# Patient Record
Sex: FEMALE | Race: WHITE
[De-identification: names, ages, dates, MRNs, and addresses within clinical notes are randomized per-mention and may not be internally consistent; named-entity substitution may affect disease eponyms.]

---

## 2019-08-11 ENCOUNTER — HOSPITAL ENCOUNTER (EMERGENCY)
Dept: HOSPITAL 65 - ER | Age: 49
LOS: 1 days | Discharge: ANOTHER HOSPITAL | End: 2019-08-12
Payer: COMMERCIAL

## 2019-08-11 VITALS — DIASTOLIC BLOOD PRESSURE: 99 MMHG | SYSTOLIC BLOOD PRESSURE: 141 MMHG

## 2019-08-11 VITALS — WEIGHT: 114 LBS | BODY MASS INDEX: 17.89 KG/M2 | HEIGHT: 67 IN

## 2019-08-11 DIAGNOSIS — I48.92: ICD-10-CM

## 2019-08-11 DIAGNOSIS — Z79.899: ICD-10-CM

## 2019-08-11 DIAGNOSIS — K74.60: ICD-10-CM

## 2019-08-11 DIAGNOSIS — K85.90: Primary | ICD-10-CM

## 2019-08-11 LAB
APPEARANCE UR: CLEAR
BASOPHILS # BLD AUTO: 0 10^3/UL (ref 0–0.1)
BASOPHILS NFR BLD AUTO: 0.4 % (ref 0–0.2)
BILIRUB UR STRIP.AUTO-MCNC: NEGATIVE MG/DL
COLOR UR: YELLOW
EOSINOPHIL # BLD AUTO: 0.2 10^3/UL (ref 0–0.2)
EOSINOPHIL NFR BLD AUTO: 2.4 % (ref 0–5)
ERYTHROCYTE [DISTWIDTH] IN BLOOD BY AUTOMATED COUNT: 16.4 % (ref 11.5–14.5)
HGB BLD-MCNC: 11 G/DL (ref 12–15)
LYMPHOCYTES # BLD AUTO: 1.7 10^3/UL (ref 1–4.8)
LYMPHOCYTES NFR BLD AUTO: 23.9 % (ref 24–44)
MCH RBC QN AUTO: 34.7 PG (ref 26–34)
MCHC RBC AUTO-ENTMCNC: 34.4 G/DL (ref 33–37)
MCV RBC AUTO: 100.9 FL (ref 78–100)
MONOCYTES # BLD AUTO: 0.9 10^3/UL (ref 0.3–0.8)
MONOCYTES NFR BLD AUTO: 13.4 % (ref 5–12)
NEUTROPHILS # BLD AUTO: 4.1 10^3/UL (ref 1.8–7.7)
NEUTROPHILS NFR BLD AUTO: 59.3 % (ref 41–85)
PLATELET # BLD AUTO: 84 10^3/UL (ref 150–400)
PMV BLD AUTO: 11.9 FL (ref 7.8–11)
UROBILINOGEN UR QL STRIP.AUTO: 1
WBC # BLD AUTO: 7 10^3/UL (ref 4.5–11)

## 2019-08-11 PROCEDURE — 82140 ASSAY OF AMMONIA: CPT

## 2019-08-11 PROCEDURE — 85025 COMPLETE CBC W/AUTO DIFF WBC: CPT

## 2019-08-11 PROCEDURE — 85610 PROTHROMBIN TIME: CPT

## 2019-08-11 PROCEDURE — 86677 HELICOBACTER PYLORI ANTIBODY: CPT

## 2019-08-11 PROCEDURE — 96375 TX/PRO/DX INJ NEW DRUG ADDON: CPT

## 2019-08-11 PROCEDURE — 99285 EMERGENCY DEPT VISIT HI MDM: CPT

## 2019-08-11 PROCEDURE — 96372 THER/PROPH/DIAG INJ SC/IM: CPT

## 2019-08-11 PROCEDURE — 36415 COLL VENOUS BLD VENIPUNCTURE: CPT

## 2019-08-11 PROCEDURE — 74176 CT ABD & PELVIS W/O CONTRAST: CPT

## 2019-08-11 PROCEDURE — 80320 DRUG SCREEN QUANTALCOHOLS: CPT

## 2019-08-11 PROCEDURE — 96374 THER/PROPH/DIAG INJ IV PUSH: CPT

## 2019-08-11 PROCEDURE — 81002 URINALYSIS NONAUTO W/O SCOPE: CPT

## 2019-08-11 PROCEDURE — 83690 ASSAY OF LIPASE: CPT

## 2019-08-11 PROCEDURE — 82150 ASSAY OF AMYLASE: CPT

## 2019-08-11 PROCEDURE — 85730 THROMBOPLASTIN TIME PARTIAL: CPT

## 2019-08-11 PROCEDURE — 93005 ELECTROCARDIOGRAM TRACING: CPT

## 2019-08-11 PROCEDURE — 80053 COMPREHEN METABOLIC PANEL: CPT

## 2019-08-11 NOTE — ER.PDOC
General


Chief Complaint:  Requesting Medical Care


Stated Complaint:  ABD PAIN


Time seen by MD:  23:33


Source:  patient


Exam Limitations:  no limitations





History of Present Illness


Timing/Duration:  24 hours


Severity/Quality:  moderate


Radiation:  no radiation


Associated Symptoms:  swelling/mass in abdomen


Exacerbated by:  nothing


Relieved By:  nothing


Allergies:  


Coded Allergies:  


     No Known Drug Allergies (Verified  Allergy, Unknown, 8/11/19)





Past Medical History


Medical History:  no pertinent history





Reviewed


Nursing Reviewed:  Vital Signs, Abn. Noted





All Other Systems:  Reviewed and Negative





Physical Exam


General Appearance:  No Apparent Distress, WD/WN


HEENT:  PERRL/EOMI, Normal ENT Inspection, TMs Normal, Pharynx Normal


Neck:  Non-Tender, Full Range of Motion, Supple, Normal Inspection


Respiratory:  chest non-tender, lungs clear, normal breath sounds, no 

respiratory distress, no accessory muscle use


Cardiovascular:  Normal Peripheral Pulses, Regular Rate, Rhythm, No Edema, No 

Gallop, No JVD, No Murmur


Gastrointestinal:  Distended, Tenderness


Extremities:  Swelling


Neurologic/Psychiatric:  CNs II-XII NML as Tested, No Motor/Sensory Deficits, 

Alert, Normal Mood/Affect, Oriented x 3


Skin:  Normal Color, Warm/Dry


Lymphatic:  No Adenopathy





Results/Orders


Results/Orders





Orders - PATI SAEZ MD


Cbc With Auto Diff (8/11/19 23:20)


Comprehensive Metabolic Panel (8/11/19 23:20)


Amylase (8/11/19 23:20)


Lipase (8/11/19 23:20)


Helicobacter Pylori (8/11/19 23:20)


PT (8/11/19 23:20)


Partial Thromboplastin Time. (8/11/19 23:20)


Urinalysis (8/11/19 23:20)


Ct Abd/Pelvis Wo Iv Contrast (8/11/19 23:20)


Fentanyl Citrate/Pf (Sublimaze) (8/11/19 23:20)


Ondansetron (Zofran Odt) (8/11/19 23:20)


Ammonia (8/11/19 23:31)


Ondansetron (Zofran Odt) (8/11/19 23:56)


Fentanyl Citrate/Pf (Sublimaze) (8/11/19 23:57)


Alcohol(Ml) (8/12/19 00:16)


0.9 % Sodium Chloride (Ns 1000ml) (8/12/19 01:00)


0.9 % Sodium Chloride (Ns 1000ml) (8/12/19 00:46)





Administered Medications








 Medications


  (Trade)  Dose


 Ordered  Sig/Ralph


 Route


 PRN Reason  Start Time


 Stop Time Status Last Admin


Dose Admin


 


 Fentanyl Citrate


  (Sublimaze)  50 mcg  STAT  STAT


 IM


   8/11/19 23:20


 8/11/19 23:23 DC 8/12/19 00:05


50 MCG


 


 Ondansetron HCl


  (Zofran Odt)  4 mg  STAT  STAT


 SL


   8/11/19 23:20


 8/11/19 23:23 DC 8/12/19 00:05


4 MG


 


 Sodium Chloride  1,000 ml @ 


 0 mls/hr  Q0M ONCE


 IV


   8/12/19 01:00


 8/12/19 01:01 DC 8/12/19 00:52


0 MLS/HR








                                Laboratory Tests








Test


 8/11/19


23:00 8/11/19


23:43


 


Urine Collection Type UNKNOWN   


 


Urine Color


 YELLOW


(YELLOW) 





 


Urine Appearance CLEAR (CLEAR)   


 


Urine Bilirubin


 NEGATIVE MG/DL


(NEGATIVE) 





 


Urine Ketones


 NEGATIVE


(NEGATIVE) 





 


Urine Specific Gravity


 1.020


(1.005-1.035) 





 


Urine pH 6 (5.0-6.0)   


 


Urine Protein


 NEGATIVE


(NEGATIVE) 





 


Urine Urobilinogen


 1.0 (NEGATIVE)


H 





 


Urine Nitrate


 NEGATIVE


(NEGATIVE) 





 


Urine Leukocyte Esterase


 NEGATIVE


(NEGATIVE) 





 


Urine Blood


 NEGATIVE


(NEGATIVE) 





 


Urine Glucose


 NORMAL


(NEGATIVE) 





 


White Blood Count


 


 7.0 10^3/uL


(4.5-11.0)


 


Red Blood Count


 


 3.17 10^6/uL


(4.00-5.20)  L


 


Hemoglobin


 


 11.0 g/dL


(12.0-15.0)  L


 


Hematocrit


 


 32.0 %


(36.0-46.0)  L


 


Mean Corpuscular Volume


 


 100.9 fL


()  H


 


Mean Corpuscular Hemoglobin


 


 34.7 pg


(26-34)  H


 


Mean Corpuscular Hemoglobin


Concent 


 34.4 g/dL


(33-37)


 


Red Cell Distribution Width


 


 16.4 %


(11.5-14.5)  H


 


Platelet Count


 


 84 10^3/uL


(150-400)  L


 


Mean Platelet Volume


 


 11.9 fL


(7.8-11.0)  H


 


Neutrophils (%) (Auto)


 


 59.3 %


(41.0-85.0)


 


Lymphocytes (%) (Auto)


 


 23.9 %


(24.0-44.0)  L


 


Monocytes (%) (Auto)


 


 13.4 %


(5.0-12.0)  H


 


Neutrophils # (Auto)


 


 4.1 10^3/uL


(1.8-7.7)


 


Lymphocytes # (Auto)


 


 1.7 10^3/uL


(1.0-4.8)


 


Monocytes # (Auto)


 


 0.9 10^3/uL


(0.3-0.8)  H


 


Absolute Immature Granulocyte


(auto 


 0.04 10^3 u/L


(0-2)


 


Immature Granulocytes %


 


 0.60 %


(0.00-0.50)  H


 


Eosinophils %


 


 2.4 %


(0.0-5.0)


 


Basophils %


 


 0.4 %


(0.0-0.2)  H


 


Basophils #


 


 0.0 10^3/uL


(0.0-0.1)


 


Eosinophil Count


 


 0.2 10^3/uL


(0.0-0.2)


 


Prothrombin Time


 


 13.4 SEC


(9.4-11.5)  H


 


Prothrombin Time INR


(Non-Therap) 


 1.3  





 


Activated Partial


Thromboplast Time 


 32.9 SEC


(24.67-30.72)


 


Sodium Level


 


 135 mmol/L


(132-145)


 


Potassium Level


 


 4.3 mmol/L


(3.6-5.2)


 


Chloride Level


 


 105.0 mmol/L


()


 


Carbon Dioxide Level


 


 19.8 mmol/L


(20.0-32)  L


 


Anion Gap  14.5  


 


Blood Urea Nitrogen


 


 18 mg/dL


(7-18)


 


Creatinine


 


 1.49 mg/dL


(0.59-1.40)  H


 


Estimated GFR (


American) 


 44.6 (>/=60)  





 


BUN/Creatinine Ratio  12.0  


 


Glucose Level


 


 90 mg/dL


()


 


Calcium Level


 


 8.1 mg/dL


(8.4-10.5)  L


 


Total Bilirubin


 


 2.6 mg/dL


(0.2-1.0)  H


 


Aspartate Amino Transferase


(AST) 


 269 U/L (0-35)


H


 


Alanine Aminotransferase (ALT)


 


 166 U/L


(12-78)  H


 


Alkaline Phosphatase


 


 250 U/L


()  H


 


Ammonia


 


 80 umol/L


(11-35)  H


 


Total Protein


 


 6.6 g/dL


(6.4-8.2)


 


Albumin


 


 2.6 g/dL


(3.4-5.0)  L


 


Globulin  4.0  


 


Amylase Level


 


 243 U/L


()  H


 


Lipase


 


 1356 U/L


(114-286)  H


 


Helicobacter pylori Screen


 


 NEGATIVE


(NEGATIVE)











Consult/PCP


Time Consult/PCP Called:  01:25





Course


Vitals & review Data





Laboratory Tests








Test


 8/11/19


23:00 8/11/19


23:43


 


Urine Collection Type UNKNOWN  


 


Urine Color YELLOW  


 


Urine Appearance CLEAR  


 


Urine Bilirubin NEGATIVE MG/DL  


 


Urine Ketones NEGATIVE  


 


Urine Specific Gravity 1.020  


 


Urine pH 6  


 


Urine Protein NEGATIVE  


 


Urine Urobilinogen 1.0  


 


Urine Nitrate NEGATIVE  


 


Urine Leukocyte Esterase NEGATIVE  


 


Urine Blood NEGATIVE  


 


Urine Glucose NORMAL  


 


White Blood Count  7.0 10^3/uL 


 


Red Blood Count  3.17 10^6/uL 


 


Hemoglobin  11.0 g/dL 


 


Hematocrit  32.0 % 


 


Mean Corpuscular Volume  100.9 fL 


 


Mean Corpuscular Hemoglobin  34.7 pg 


 


Mean Corpuscular Hemoglobin


Concent 


 34.4 g/dL 





 


Red Cell Distribution Width  16.4 % 


 


Platelet Count  84 10^3/uL 


 


Mean Platelet Volume  11.9 fL 


 


Neutrophils (%) (Auto)  59.3 % 


 


Lymphocytes (%) (Auto)  23.9 % 


 


Monocytes (%) (Auto)  13.4 % 


 


Neutrophils # (Auto)  4.1 10^3/uL 


 


Lymphocytes # (Auto)  1.7 10^3/uL 


 


Monocytes # (Auto)  0.9 10^3/uL 


 


Absolute Immature Granulocyte


(auto 


 0.04 10^3 u/L 





 


Immature Granulocytes %  0.60 % 


 


Eosinophils %  2.4 % 


 


Basophils %  0.4 % 


 


Basophils #  0.0 10^3/uL 


 


Eosinophil Count  0.2 10^3/uL 


 


Prothrombin Time  13.4 SEC 


 


Prothrombin Time INR


(Non-Therap) 


 1.3 





 


Activated Partial


Thromboplast Time 


 32.9 SEC 





 


Sodium Level  135 mmol/L 


 


Potassium Level  4.3 mmol/L 


 


Chloride Level  105.0 mmol/L 


 


Carbon Dioxide Level  19.8 mmol/L 


 


Anion Gap  14.5 


 


Blood Urea Nitrogen  18 mg/dL 


 


Creatinine  1.49 mg/dL 


 


Estimated GFR (


American) 


 44.6 





 


BUN/Creatinine Ratio  12.0 


 


Glucose Level  90 mg/dL 


 


Calcium Level  8.1 mg/dL 


 


Total Bilirubin  2.6 mg/dL 


 


Aspartate Amino Transf


(AST/SGOT) 


 269 U/L 





 


Alanine Aminotransferase


(ALT/SGPT) 


 166 U/L 





 


Alkaline Phosphatase  250 U/L 


 


Ammonia  80 umol/L 


 


Total Protein  6.6 g/dL 


 


Albumin  2.6 g/dL 


 


Globulin  4.0 


 


Amylase Level  243 U/L 


 


Lipase  1356 U/L 


 


Helicobacter pylori Screen  NEGATIVE 











Departure


Time of Disposition:  01:33


Disposition:  02 XFER SHT-TRM HOSP


Impression:  


   Primary Impression:  


   Pancreatitis, gallstone


   Additional Impression:  


   Cirrhosis of liver


Condition:  Improved


Referrals:  


PCP,UNKNOWN (PCP)


PRIMARY CARE PROVIDER


Duration or Time Spent with Pa:  30 m





Problem Qualifiers











PATI SAEZ MD              Aug 11, 2019 23:35

## 2019-08-11 NOTE — NUR
ARRIVAL 

PATIENT PRESENTS WITH COMPLAINTS OF GENERALIZED ABDOMINAL PAIN FOR THE PAST 
DAY. HX HEPATITIS C, CIRRHOSIS, GALLSTONES. RATES PAIN 9/10 AT THIS TIME. 
AMBULATORY WITH STEADY GAIT. CONNECTED TO MONITOR. VSS. NO IMMEDIATE DISTRESS 
NOTED. MD SE NOTIFIED.

## 2019-08-12 VITALS — DIASTOLIC BLOOD PRESSURE: 75 MMHG | SYSTOLIC BLOOD PRESSURE: 134 MMHG

## 2019-08-12 VITALS — SYSTOLIC BLOOD PRESSURE: 130 MMHG | DIASTOLIC BLOOD PRESSURE: 98 MMHG

## 2019-08-12 VITALS — DIASTOLIC BLOOD PRESSURE: 78 MMHG | SYSTOLIC BLOOD PRESSURE: 132 MMHG

## 2019-08-12 VITALS — SYSTOLIC BLOOD PRESSURE: 136 MMHG | DIASTOLIC BLOOD PRESSURE: 81 MMHG

## 2019-08-12 VITALS — SYSTOLIC BLOOD PRESSURE: 139 MMHG | DIASTOLIC BLOOD PRESSURE: 79 MMHG

## 2019-08-12 VITALS — SYSTOLIC BLOOD PRESSURE: 137 MMHG | DIASTOLIC BLOOD PRESSURE: 79 MMHG

## 2019-08-12 LAB
ALP INTEST CFR SERPL: 250 U/L (ref 50–136)
ALT SERPL-CCNC: 166 U/L (ref 12–78)
AST SERPL-CCNC: 269 U/L (ref 0–35)
CALCIUM SERPL-MCNC: 8.1 MG/DL (ref 8.4–10.5)
CO2 BLDA-SCNC: 19.8 MMOL/L (ref 20–32)
GLUCOSE PRE 100 G GLC PO SERPL-MCNC: 90 MG/DL (ref 70–110)

## 2019-08-12 NOTE — NUR
[facility]

HANDOFF REPORT GIVEN TO [facility]. ALL QUESTIONS ANSWERED. PATIENT STABLE AT 
TIME OF HANDOFF.

## 2019-08-12 NOTE — NUR
HR

MEDICATIONS ADMINISTERED PER ORDERS OF DR SAEZ; SEE MAR. PATIENT'S HR 
CURRENTLY 97. MD SE AT BEDSIDE.

## 2019-08-12 NOTE — DIREP
PROCEDURE:CT ABDOMEN/PELVIS W/O CONTRAST

 

COMPARISON:None.

 

INDICATIONS:ABDOMINAL PAIN, H/O CIRRHOSIS

 

TECHNIQUE:Axial images were created through the abdomen and pelvis without 

intravenous contrast material. 

No oral contrast was administered.

Sagittal and coronal reconstructions were performed from source images.

 

FINDINGS:

LUNG BASES:Small emphysematous bleb in the medial aspect of the left lower 

lung field.  No acute infiltrates. 

LIVER:Cirrhotic liver with a small amount of ascites surrounding the liver and 

throughout the abdomen  

BILIARY:Cholelithiasis.  

PANCREAS:Normal.  No lesion, fluid collection, ductal dilatation, or atrophy.  

 

SPLEEN:Normal.  No enlargement or focal lesion. 

ADRENALS:Normal.  No mass or enlargement.  

URINARY TRACT:Normal.  No focal lesions or hydronephrosis.  No 

nephrolithiasis, ureterolithiasis or ureteral obstruction.

AORTA/VASCULAR:Normal.  No aneurysm. 

RETROPERITONEUM:Normal.  No mass or adenopathy.  

BOWEL/MESENTERY:Limited by lack of oral or IV contrast.  Small hiatal hernia.  

No obvious ileus or obstruction or perforation.  If clinical symptoms persist 

or worsen, follow-up study with oral and IV contrast might be of aid in further 

evaluation.

ABDOMINAL WALL:Normal.  No mass or hernia.  

PELVIC ORGANS:Normal.  No visible mass.  Pelvic organs appropriate for patient 

age.  

BONES:Normal for age.  No bony lesion or acute fracture.  

OTHER:Negative.  

 

CONCLUSION:

1. Cirrhotic liver.

2. Prominent vessels between the spleen and stomach most likely representing 

gastric varices.

3. Small amount of ascites surrounding the liver and scattered throughout 

remainder of the abdomen.

4. Cholelithiasis.

5. Small hiatal hernia.

6. No CT evidence of ileus, obstruction or perforation.  The appendix is not 

visualized.  If clinical symptoms persist or worsen, follow-up study with oral 

and IV contrast might be helpful in further evaluation for a subtle 

abnormality.

 

 

 

Dictated by: Sandip Garcia M.D. on 08/12/2019 at 01:04 AM     

Electronically Signed By: Sandip Garcia M.D. on 08/12/2019 at 01:11 AM

## 2019-08-12 NOTE — PCM.EKG
[facility]

                                       

Test Date:    2019               Test Time:    03:04:28

Pat Name:     NAVDEEP YANEZ          Department:   

[id number]          Room:          

Gender:       F                        Technician:   MA

:          [id number]               Requested By: PATI SYKES

Order Number: [id number]           Reading MD:   Pati Sykes

                                 Measurements

Intervals                              Axis          

Rate:         159                      P:            

VT:                                    QRS:          34

QRSD:         150                      T:            73

QT:           300                                    

QTc:          488                                    

                           Interpretive Statements

sv tachycardia

Nonspecific intraventricular block

Abnormal ECG

No previous ECG available for comparison



Electronically Signed On 8- 15:45:40 CDT by Pati Sykes



Please click the below link to view image of tracing.

## 2019-08-19 ENCOUNTER — HOSPITAL ENCOUNTER (EMERGENCY)
Dept: HOSPITAL 65 - ER | Age: 49
Discharge: OTHER INSTITUTION | End: 2019-08-19
Payer: COMMERCIAL

## 2019-08-19 VITALS — SYSTOLIC BLOOD PRESSURE: 117 MMHG | DIASTOLIC BLOOD PRESSURE: 68 MMHG

## 2019-08-19 VITALS — SYSTOLIC BLOOD PRESSURE: 117 MMHG | DIASTOLIC BLOOD PRESSURE: 67 MMHG

## 2019-08-19 VITALS — SYSTOLIC BLOOD PRESSURE: 121 MMHG | DIASTOLIC BLOOD PRESSURE: 99 MMHG

## 2019-08-19 VITALS — BODY MASS INDEX: 19.3 KG/M2 | HEIGHT: 67 IN | WEIGHT: 123 LBS

## 2019-08-19 VITALS — DIASTOLIC BLOOD PRESSURE: 99 MMHG | SYSTOLIC BLOOD PRESSURE: 121 MMHG

## 2019-08-19 DIAGNOSIS — K85.90: Primary | ICD-10-CM

## 2019-08-19 DIAGNOSIS — D64.9: ICD-10-CM

## 2019-08-19 DIAGNOSIS — F17.210: ICD-10-CM

## 2019-08-19 DIAGNOSIS — Z90.710: ICD-10-CM

## 2019-08-19 DIAGNOSIS — K74.60: ICD-10-CM

## 2019-08-19 DIAGNOSIS — I50.9: ICD-10-CM

## 2019-08-19 DIAGNOSIS — R73.9: ICD-10-CM

## 2019-08-19 DIAGNOSIS — J44.9: ICD-10-CM

## 2019-08-19 LAB
ALP INTEST CFR SERPL: 153 U/L (ref 50–136)
ALT SERPL-CCNC: 135 U/L (ref 12–78)
APPEARANCE UR: CLEAR
AST SERPL-CCNC: 244 U/L (ref 0–35)
BASOPHILS # BLD AUTO: 0 10^3/UL (ref 0–0.1)
BASOPHILS NFR BLD AUTO: 0.3 % (ref 0–0.2)
BILIRUB UR STRIP.AUTO-MCNC: NEGATIVE MG/DL
CALCIUM SERPL-MCNC: 8 MG/DL (ref 8.4–10.5)
CO2 BLDA-SCNC: 26.6 MMOL/L (ref 20–32)
COLOR UR: YELLOW
EOSINOPHIL # BLD AUTO: 0.1 10^3/UL (ref 0–0.2)
EOSINOPHIL NFR BLD AUTO: 2.4 % (ref 0–5)
ERYTHROCYTE [DISTWIDTH] IN BLOOD BY AUTOMATED COUNT: 16 % (ref 11.5–14.5)
GLUCOSE PRE 100 G GLC PO SERPL-MCNC: 176 MG/DL (ref 70–110)
HGB BLD-MCNC: 9.3 G/DL (ref 12–15)
LYMPHOCYTES # BLD AUTO: 1.1 10^3/UL (ref 1–4.8)
LYMPHOCYTES NFR BLD AUTO: 28.6 % (ref 24–44)
MCH RBC QN AUTO: 35 PG (ref 26–34)
MCHC RBC AUTO-ENTMCNC: 34.4 G/DL (ref 33–37)
MCV RBC AUTO: 101.5 FL (ref 78–100)
MONOCYTES # BLD AUTO: 0.4 10^3/UL (ref 0.3–0.8)
MONOCYTES NFR BLD AUTO: 10.2 % (ref 5–12)
NEUTROPHILS # BLD AUTO: 2.2 10^3/UL (ref 1.8–7.7)
NEUTROPHILS NFR BLD AUTO: 58.2 % (ref 41–85)
PLATELET # BLD AUTO: 69 10^3/UL (ref 150–400)
PMV BLD AUTO: 10.7 FL (ref 7.8–11)
UROBILINOGEN UR QL STRIP.AUTO: NORMAL
WBC # BLD AUTO: 3.7 10^3/UL (ref 4.5–11)

## 2019-08-19 PROCEDURE — 96375 TX/PRO/DX INJ NEW DRUG ADDON: CPT

## 2019-08-19 PROCEDURE — 81002 URINALYSIS NONAUTO W/O SCOPE: CPT

## 2019-08-19 PROCEDURE — 85610 PROTHROMBIN TIME: CPT

## 2019-08-19 PROCEDURE — 82150 ASSAY OF AMYLASE: CPT

## 2019-08-19 PROCEDURE — 99285 EMERGENCY DEPT VISIT HI MDM: CPT

## 2019-08-19 PROCEDURE — 83690 ASSAY OF LIPASE: CPT

## 2019-08-19 PROCEDURE — 36415 COLL VENOUS BLD VENIPUNCTURE: CPT

## 2019-08-19 PROCEDURE — 85730 THROMBOPLASTIN TIME PARTIAL: CPT

## 2019-08-19 PROCEDURE — 96374 THER/PROPH/DIAG INJ IV PUSH: CPT

## 2019-08-19 PROCEDURE — 74176 CT ABD & PELVIS W/O CONTRAST: CPT

## 2019-08-19 PROCEDURE — 96361 HYDRATE IV INFUSION ADD-ON: CPT

## 2019-08-19 PROCEDURE — 86677 HELICOBACTER PYLORI ANTIBODY: CPT

## 2019-08-19 PROCEDURE — 80053 COMPREHEN METABOLIC PANEL: CPT

## 2019-08-19 PROCEDURE — 85025 COMPLETE CBC W/AUTO DIFF WBC: CPT

## 2019-08-19 NOTE — DIREP
PROCEDURE:CT ABDOMEN/PELVIS W/O CONTRAST

 

COMPARISON:[facility], CT, CT ABD/PELVIS W/O, 08/11/2019, 11:37 

PM.

 

INDICATIONS:upper abdominal pain

 

TECHNIQUE:Axial images were created through the abdomen and pelvis without 

intravenous contrast material. 

No oral contrast was administered.

Sagittal and coronal reconstructions were performed from source images.

 

FINDINGS:

LUNG BASES:Normal.  No visible pulmonary or pleural disease.  

LIVER:Small nodular liver

BILIARY:Gallstone

PANCREAS:Normal.  No lesion, fluid collection, ductal dilatation, or atrophy.  

 

SPLEEN:Normal.  No enlargement or focal lesion. 

ADRENALS:Normal.  No mass or enlargement.  

URINARY TRACT:Normal.  No focal lesions or hydronephrosis.

AORTA/VASCULAR: Aortoiliac atherosclerotic vascular calcium.  

RETROPERITONEUM:Normal.  No mass or adenopathy.  

BOWEL/MESENTERY:There appears to be diffuse gastric and small bowel wall 

thickening

ABDOMINAL WALL:Normal.  No mass or hernia.  

PELVIC ORGANS:Normal.  No visible mass.  Pelvic organs appropriate for patient 

age.  

BONES:Right femoral surgical hardware

OTHER:Mild ascites

 

CONCLUSION:

1. Cirrhotic liver.  Mild ascites.

2. Cholelithiasis without imaging evidence for cholecystitis.  

3. Diffuse gastric and small bowel wall thickening is suspected.  This could be 

artifactual secondary to lack of distention with oral contrast.  However, most 

likely etiology is metabolic abnormality for example hypoalbuminemia.  

 

 

Dictated by: Santos Gtz Jr. on 08/19/2019 at 09:38 PM     

Electronically Signed By: Santos Gtz Jr. on 08/19/2019 at 09:41 PM

## 2019-08-19 NOTE — ER.PDOC
General


Chief Complaint:  Female Urogenital Problems


Stated Complaint:  FEMALE 


Time seen by MD:  20:15


Source:  patient


Exam Limitations:  no limitations





History of Present Illness


Initial Comments


abdominal pain for one day, patient was admitted to [facility] one week 

ago for pancreatitis, she states her abd pain got better and that she was 

discharged from the hospital and the pain came back yesterday, nausea without 

vomiting and diarrhea, no fever, abd pain is upper abdoment and dull in nature 

simialr to previous pancreatitis attack.


Timing/Duration:  24 hours


Severity/Quality:  moderate


Radiation:  RUQ, LUQ, epigastric


Exacerbated by:  food


Relieved By:  nothing


Allergies:  


Coded Allergies:  


     No Known Drug Allergies (Verified  Allergy, Unknown, 19)


Vital Signs





First Vital Signs








  Date Time  Temp Pulse Resp B/P (MAP) Pulse Ox O2 Delivery O2 Flow Rate FiO2


 


19 03:02  162      


 


19 03:24 208.9       


 


19 20:24   18     


 


19 20:24     99 Room Air  


 


19 20:24    117/67 (84)    








Last Vital Signs








  Date Time  Temp Pulse Resp B/P (MAP) Pulse Ox O2 Delivery O2 Flow Rate FiO2


 


19 21:30 99.3 92 16 117/68 (84) 99 Room Air  











Past Medical History


Medical History:  congestive heart failure, COPD


Surgical History:  , hysterectomy, other


LMP (females 10-50):  hysterectomy





Social History


Smoking:  cigarettes


Alcohol Use:  none


Drug Use:  none





Constitutional:  denies fever


EENTM:  denies ear pain, denies throat pain


Respiratory:  denies cough, denies shortness of breath, denies SOB with 

exertion, denies SOB at rest, denies wheezing


Cardiovascular:  denies chest pain, denies irregular heart rate, denies 

lightheadedness, denies palpitations, denies syncope


Gastrointestinal:  abdominal pain, diarrhea, nausea


Genitourinary:  denies dysuria, denies flank pain


Musculoskeletal:  denies back pain


Skin:  denies rash


Psychiatric/Neurological:  denies anxiety


Endocrine:  denies excessive sweating


Hematologic/Lymphatic:  denies anemia





Physical Exam


General Appearance:  No Apparent Distress


HEENT:  PERRL/EOMI, Normal ENT Inspection


Neck:  Non-Tender


Respiratory:  chest non-tender, lungs clear, normal breath sounds


Cardiovascular:  Regular Rate, Rhythm


Gastrointestinal:  Soft, Tenderness


Back:  Normal Inspection, No CVA Tenderness


Extremities:  Normal Range of Motion, Non-Tender


Neurologic/Psychiatric:  CNs II-XII NML as Tested, No Motor/Sensory Deficits, 

Alert, Normal Mood/Affect


Skin:  Normal Color





Results/Orders


Results/Orders





Orders - STEVEN FRENCH MD


Cbc With Auto Diff (19 20:31)


Comprehensive Metabolic Panel (19 20:31)


Amylase (19 20:31)


Lipase (19 20:31)


Helicobacter Pylori (19 20:31)


PT (19 20:31)


Partial Thromboplastin Time. (19 20:31)


Urinalysis (19 20:31)


Saline Lock (19 20:31)


Ondansetron Hcl (Zofran) (19 20:33)


Morphine Sulfate (Morphine Sulfate) (19 21:00)


0.9 % Sodium Chloride (Ns 1000ml) (19 21:00)


0.9 % Sodium Chloride (Ns 1000ml) (19 20:33)


0.9 % Sodium Chloride (Ns 1000ml) (19 20:45)


Ondansetron Hcl (Zofran) (19 20:46)


Morphine Sulfate (Morphine Sulfate) (19 20:46)


Ct Abd/Pelvis Wo Iv Contrast (19 21:18)





Vital Signs








  Date Time  Temp Pulse Resp B/P (MAP) Pulse Ox O2 Delivery O2 Flow Rate FiO2


 


19 21:30 99.3 92 16 117/68 (84) 99 Room Air  


 


19 20:24 99.3 95 18 117/67 (84) 99 Room Air  


 


19 20:24 99.3 95 18  99 Room Air  


 


19 20:24 99.3 95 18     


 


19 03:24 208.9 97      


 


19 03:02  162      








Administered Medications








 Medications


  (Trade)  Dose


 Ordered  Sig/Ralph


 Route


 PRN Reason  Start Time


 Stop Time Status Last Admin


Dose Admin


 


 Morphine Sulfate


  (Morphine


 Sulfate)  4 mg  STAT  ONCE


 IV


   19 21:00


 19 21:01 DC 19 20:56


4 MG


 


 Ondansetron HCl


  (Zofran)  4 mg  STAT  STAT


 IV


   19 20:33


 19 20:35 DC 19 20:56


4 MG


 


 Sodium Chloride  1,000 ml @ 


 0 mls/hr  Q0M ONCE


 IV


   19 21:00


 19 21:01 DC 19 20:56


1,200 MLS/HR








                                Laboratory Tests








Test


 19


20:42


 


White Blood Count


 3.7 10^3/uL


(4.5-11.0)  L


 


Red Blood Count


 2.66 10^6/uL


(4.00-5.20)  L


 


Hemoglobin


 9.3 g/dL


(12.0-15.0)  L


 


Hematocrit


 27.0 %


(36.0-46.0)  L


 


Mean Corpuscular Volume


 101.5 fL


()  H


 


Mean Corpuscular Hemoglobin


 35.0 pg


(26-34)  H


 


Mean Corpuscular Hemoglobin


Concent 34.4 g/dL


(33-37)


 


Red Cell Distribution Width


 16.0 %


(11.5-14.5)  H


 


Platelet Count


 69 10^3/uL


(150-400)  L


 


Mean Platelet Volume


 10.7 fL


(7.8-11.0)


 


Neutrophils (%) (Auto)


 58.2 %


(41.0-85.0)


 


Lymphocytes (%) (Auto)


 28.6 %


(24.0-44.0)


 


Monocytes (%) (Auto)


 10.2 %


(5.0-12.0)


 


Neutrophils # (Auto)


 2.2 10^3/uL


(1.8-7.7)


 


Lymphocytes # (Auto)


 1.1 10^3/uL


(1.0-4.8)


 


Monocytes # (Auto)


 0.4 10^3/uL


(0.3-0.8)


 


Absolute Immature Granulocyte


(auto 0.01 10^3 u/L


(0-2)


 


Immature Granulocytes %


 0.30 %


(0.00-0.50)


 


Eosinophils %


 2.4 %


(0.0-5.0)


 


Basophils %


 0.3 %


(0.0-0.2)  H


 


Basophils #


 0.0 10^3/uL


(0.0-0.1)


 


Eosinophil Count


 0.1 10^3/uL


(0.0-0.2)


 


Prothrombin Time


 14.5 SEC


(9.4-11.5)  H


 


Prothrombin Time INR


(Non-Therap) 1.4  





 


Activated Partial


Thromboplast Time 35.0 SEC


(24.67-30.72)


 


Urine Collection Type VOID  


 


Urine Color


 YELLOW


(YELLOW)


 


Urine Appearance CLEAR (CLEAR)  


 


Urine Bilirubin


 NEGATIVE MG/DL


(NEGATIVE)


 


Urine Ketones


 NEGATIVE


(NEGATIVE)


 


Urine Specific Gravity


 1.010


(1.005-1.035)


 


Urine pH 8 (5.0-6.0)  


 


Urine Protein


 NEGATIVE


(NEGATIVE)


 


Urine Urobilinogen


 NORMAL


(NEGATIVE)


 


Urine Nitrate


 NEGATIVE


(NEGATIVE)


 


Urine Leukocyte Esterase


 NEGATIVE


(NEGATIVE)


 


Urine Blood


 NEGATIVE


(NEGATIVE)


 


Urine Glucose


 NORMAL


(NEGATIVE)


 


Sodium Level


 138 mmol/L


(132-145)


 


Potassium Level


 4.2 mmol/L


(3.6-5.2)


 


Chloride Level


 103.0 mmol/L


()


 


Carbon Dioxide Level


 26.6 mmol/L


(20.0-32)


 


Anion Gap 12.6  


 


Blood Urea Nitrogen


 8 mg/dL (7-18)





 


Creatinine


 1.52 mg/dL


(0.59-1.40)  H


 


Estimated GFR (


American) 43.6 (>/=60)  





 


BUN/Creatinine Ratio 5.0  


 


Glucose Level


 176 mg/dL


()  H


 


Calcium Level


 8.0 mg/dL


(8.4-10.5)  L


 


Total Bilirubin


 1.9 mg/dL


(0.2-1.0)  H


 


Aspartate Amino Transferase


(AST) 244 U/L (0-35)


H


 


Alanine Aminotransferase (ALT)


 135 U/L


(12-78)  H


 


Alkaline Phosphatase


 153 U/L


()  H


 


Total Protein


 5.6 g/dL


(6.4-8.2)  L


 


Albumin


 2.0 g/dL


(3.4-5.0)  L


 


Globulin 3.6  


 


Amylase Level


 180 U/L


()  H


 


Lipase


 1146 U/L


(114-286)  H


 


Helicobacter pylori Screen


 NEGATIVE


(NEGATIVE)











Progress


Progress


discussed case with [facility] transfer line, auto accepted by dr rebolledo , patient 

transferred to [facility] secondary to just being released from their facility on 







Course


Sepsis Screening Results: Posi:  POSITIVE SEPSIS RISK


Duration or Total Time Spent w:  30 m


Vitals & review Data





Vital Sign - Last 24 Hours








 8/19





 03:02 03:24 20:24 20:24


 


Temp  208.9 99.3 99.3


 


Pulse 162 97 95 95


 


Resp   18 18


 


Pulse Ox    99


 


O2 Delivery    Room Air


 


    





 19  





 20:24 21:30  


 


Temp 99.3 99.3  


 


Pulse 95 92  


 


Resp 18 16  


 


B/P (MAP) 117/67 (84) 117/68 (84)  


 


Pulse Ox 99 99  


 


O2 Delivery Room Air Room Air  








Laboratory Tests








Test


 19


20:42


 


White Blood Count 3.7 10^3/uL 


 


Red Blood Count 2.66 10^6/uL 


 


Hemoglobin 9.3 g/dL 


 


Hematocrit 27.0 % 


 


Mean Corpuscular Volume 101.5 fL 


 


Mean Corpuscular Hemoglobin 35.0 pg 


 


Mean Corpuscular Hemoglobin


Concent 34.4 g/dL 





 


Red Cell Distribution Width 16.0 % 


 


Platelet Count 69 10^3/uL 


 


Mean Platelet Volume 10.7 fL 


 


Neutrophils (%) (Auto) 58.2 % 


 


Lymphocytes (%) (Auto) 28.6 % 


 


Monocytes (%) (Auto) 10.2 % 


 


Neutrophils # (Auto) 2.2 10^3/uL 


 


Lymphocytes # (Auto) 1.1 10^3/uL 


 


Monocytes # (Auto) 0.4 10^3/uL 


 


Absolute Immature Granulocyte


(auto 0.01 10^3 u/L 





 


Immature Granulocytes % 0.30 % 


 


Eosinophils % 2.4 % 


 


Basophils % 0.3 % 


 


Basophils # 0.0 10^3/uL 


 


Eosinophil Count 0.1 10^3/uL 


 


Prothrombin Time 14.5 SEC 


 


Prothrombin Time INR


(Non-Therap) 1.4 





 


Activated Partial


Thromboplast Time 35.0 SEC 





 


Urine Collection Type VOID 


 


Urine Color YELLOW 


 


Urine Appearance CLEAR 


 


Urine Bilirubin NEGATIVE MG/DL 


 


Urine Ketones NEGATIVE 


 


Urine Specific Gravity 1.010 


 


Urine pH 8 


 


Urine Protein NEGATIVE 


 


Urine Urobilinogen NORMAL 


 


Urine Nitrate NEGATIVE 


 


Urine Leukocyte Esterase NEGATIVE 


 


Urine Blood NEGATIVE 


 


Urine Glucose NORMAL 


 


Sodium Level 138 mmol/L 


 


Potassium Level 4.2 mmol/L 


 


Chloride Level 103.0 mmol/L 


 


Carbon Dioxide Level 26.6 mmol/L 


 


Anion Gap 12.6 


 


Blood Urea Nitrogen 8 mg/dL 


 


Creatinine 1.52 mg/dL 


 


Estimated GFR (


American) 43.6 





 


BUN/Creatinine Ratio 5.0 


 


Glucose Level 176 mg/dL 


 


Calcium Level 8.0 mg/dL 


 


Total Bilirubin 1.9 mg/dL 


 


Aspartate Amino Transf


(AST/SGOT) 244 U/L 





 


Alanine Aminotransferase


(ALT/SGPT) 135 U/L 





 


Alkaline Phosphatase 153 U/L 


 


Total Protein 5.6 g/dL 


 


Albumin 2.0 g/dL 


 


Globulin 3.6 


 


Amylase Level 180 U/L 


 


Lipase 1146 U/L 


 


Helicobacter pylori Screen NEGATIVE 








Sepsis Infection Criteria Pres:  None


O2 Sat by Pulse Oximetry:  99





Departure


Time of Disposition:  21:52


Disposition:  70 DISC/XFER TO [facility]


Impression:  


   Primary Impression:  


   Pancreatitis


   Additional Impressions:  


   Anemia


   Cirrhosis


   Hyperglycemia


Condition:  Critical


Referrals:  


PCP,UNKNOWN (PCP)


PRIMARY CARE PROVIDER


Duration or Time Spent with Pa:  15





Problem Qualifiers











STEVEN FRENCH MD           Aug 19, 2019 20:39

## 2019-08-19 NOTE — NUR
REPORT TO [facility]

REPORT GIVEN TO ANN VALENTE RN AT [facility] ED

## 2019-08-24 ENCOUNTER — HOSPITAL ENCOUNTER (EMERGENCY)
Dept: HOSPITAL 65 - ER | Age: 49
LOS: 1 days | Discharge: ANOTHER HOSPITAL | End: 2019-08-25
Payer: COMMERCIAL

## 2019-08-24 VITALS — SYSTOLIC BLOOD PRESSURE: 105 MMHG | DIASTOLIC BLOOD PRESSURE: 54 MMHG

## 2019-08-24 VITALS — HEIGHT: 67 IN | BODY MASS INDEX: 20.25 KG/M2 | WEIGHT: 129 LBS

## 2019-08-24 DIAGNOSIS — I50.9: ICD-10-CM

## 2019-08-24 DIAGNOSIS — K85.10: Primary | ICD-10-CM

## 2019-08-24 DIAGNOSIS — K74.60: ICD-10-CM

## 2019-08-24 DIAGNOSIS — F17.200: ICD-10-CM

## 2019-08-24 DIAGNOSIS — E86.0: ICD-10-CM

## 2019-08-24 DIAGNOSIS — Z79.1: ICD-10-CM

## 2019-08-24 DIAGNOSIS — Z79.899: ICD-10-CM

## 2019-08-24 DIAGNOSIS — J44.9: ICD-10-CM

## 2019-08-24 DIAGNOSIS — N17.9: ICD-10-CM

## 2019-08-24 DIAGNOSIS — Z90.710: ICD-10-CM

## 2019-08-24 LAB
ALP INTEST CFR SERPL: 175 U/L (ref 50–136)
ALT SERPL-CCNC: 92 U/L (ref 12–78)
AST SERPL-CCNC: 150 U/L (ref 0–35)
BASOPHILS # BLD AUTO: 0 10^3/UL (ref 0–0.1)
BASOPHILS NFR BLD AUTO: 0.3 % (ref 0–0.2)
CALCIUM SERPL-MCNC: 7.6 MG/DL (ref 8.4–10.5)
CO2 BLDA-SCNC: 22.7 MMOL/L (ref 20–32)
EOSINOPHIL # BLD AUTO: 0.1 10^3/UL (ref 0–0.2)
EOSINOPHIL NFR BLD AUTO: 2.6 % (ref 0–5)
ERYTHROCYTE [DISTWIDTH] IN BLOOD BY AUTOMATED COUNT: 16.4 % (ref 11.5–14.5)
GLUCOSE PRE 100 G GLC PO SERPL-MCNC: 105 MG/DL (ref 70–110)
HGB BLD-MCNC: 9.1 G/DL (ref 12–15)
LYMPHOCYTES # BLD AUTO: 1.3 10^3/UL (ref 1–4.8)
LYMPHOCYTES NFR BLD AUTO: 34.5 % (ref 24–44)
MCH RBC QN AUTO: 35.4 PG (ref 26–34)
MCHC RBC AUTO-ENTMCNC: 34.5 G/DL (ref 33–37)
MCV RBC AUTO: 102.7 FL (ref 78–100)
MONOCYTES # BLD AUTO: 0.5 10^3/UL (ref 0.3–0.8)
MONOCYTES NFR BLD AUTO: 11.6 % (ref 5–12)
NEUTROPHILS # BLD AUTO: 2 10^3/UL (ref 1.8–7.7)
NEUTROPHILS NFR BLD AUTO: 50.7 % (ref 41–85)
PLATELET # BLD AUTO: 64 10^3/UL (ref 150–400)
PMV BLD AUTO: 12 FL (ref 7.8–11)
WBC # BLD AUTO: 3.9 10^3/UL (ref 4.5–11)

## 2019-08-24 PROCEDURE — 36415 COLL VENOUS BLD VENIPUNCTURE: CPT

## 2019-08-24 PROCEDURE — 83690 ASSAY OF LIPASE: CPT

## 2019-08-24 PROCEDURE — 96361 HYDRATE IV INFUSION ADD-ON: CPT

## 2019-08-24 PROCEDURE — 96372 THER/PROPH/DIAG INJ SC/IM: CPT

## 2019-08-24 PROCEDURE — 96374 THER/PROPH/DIAG INJ IV PUSH: CPT

## 2019-08-24 PROCEDURE — 85025 COMPLETE CBC W/AUTO DIFF WBC: CPT

## 2019-08-24 PROCEDURE — 99285 EMERGENCY DEPT VISIT HI MDM: CPT

## 2019-08-24 PROCEDURE — 80053 COMPREHEN METABOLIC PANEL: CPT

## 2019-08-24 NOTE — ER.PDOC
General


Chief Complaint:  Abdomen Pain


Stated Complaint:  PANCREATITIS


Time seen by MD:  23:19


Source:  patient


Exam Limitations:  no limitations





History of Present Illness


Initial Comments


Abdominal pain today. Patient was seen here on the  and  of this Month 

and transferred to [facility] both times. She has gallstones causing recurrent 

pancreatitis. The second time she was transferred to [facility] ED, she was released 

form the ED. She has liver cirrhosis.


Severity/Quality:  moderate


Radiation:  back


Associated Symptoms:  denies symptoms


Exacerbated by:  nothing


Relieved By:  nothing


Allergies:  


Coded Allergies:  


     No Known Drug Allergies (Verified  Allergy, Unknown, 19)


Home Meds


Reported Medications


Torsemide (DEMADEX) 20 Mg Tablet, 10 MG PO DAILY24, TABLET


   19


Hydroxyzine Hcl (HYDROXYZINE HCL) 25 Mg Tablet, 1 TAB PO HS, #30 TAB


   19


Naproxen (NAPROXEN) 500 Mg Tablet, 1 TAB PO BID for PAIN, #60 TAB 1 Refill


   19


Potassium Chloride (POTASSIUM CHLORIDE) 10 Meq Capsule.er, 1 CAP PO DAILY, #90 

CAP 1 Refill


   19


Lactulose (LACTULOSE) 10 Gm/15 Ml Solution, 30 MILLILITER PO BID, #5400 

MILLILITER 3 Refills


   19


Omeprazole (OMEPRAZOLE) 20 Mg Capsule.dr, 1 CAP PO BID, #30 CAP 5 Refills


   19


Furosemide (FUROSEMIDE) 20 Mg Tablet, 1 TAB PO DAILY, #90 TAB 1 Refill


   19


Spironolactone 25MG (ALDACTONE 25MG) 25 Mg Tablet, 1 TAB PO BID, #90 TAB 1 

Refill


   19


Promethazine Hcl (PROMETHAZINE HCL) 25 Mg Tablet, 25 MG PO PRN PRN for 

NAUSEA/VOMITING, TABLET


   19


Pantoprazole Sodium (PANTOPRAZOLE SODIUM) 40 Mg Tablet.dr, 1 TAB PO DAILY, #30 

TAB 3 Refills


   19


Vital Signs





First Vital Signs








  Date Time  Temp Pulse Resp B/P (MAP) Pulse Ox O2 Delivery O2 Flow Rate FiO2


 


19 03:02  162      


 


19 23:05 97.9  18  100 Room Air  


 


19 23:05    105/54 (71)    








Last Vital Signs








  Date Time  Temp Pulse Resp B/P (MAP) Pulse Ox O2 Delivery O2 Flow Rate FiO2


 


19 23:05 97.9 79 18     


 


19 23:05    105/54 (71) 100 Room Air  











Past Medical History


Medical History:  congestive heart failure, COPD, other


Surgical History:  , hysterectomy, other


LMP (females 10-50):  hysterectomy





Social History


Smoking:  less than 1 pack/day


Alcohol Use:  none


Drug Use:  none





Constitutional:  no symptoms reported


EENTM:  no symptoms reported


Respiratory:  no symptoms reported


Cardiovascular:  no symptoms reported


Gastrointestinal:  see HPI


All Other Systems:  Reviewed and Negative





Physical Exam


General Appearance:  No Apparent Distress, WD/WN


Neck:  Non-Tender, Full Range of Motion, Supple, Normal Inspection


Respiratory:  chest non-tender, lungs clear, normal breath sounds, no 

respiratory distress, no accessory muscle use


Cardiovascular:  Normal Peripheral Pulses, Regular Rate, Rhythm, No Edema, No 

Gallop, No JVD, No Murmur


Gastrointestinal:  Normal Bowel Sounds, No Organomegaly, No Pulsatile Mass, 

Tenderness (epigastric)


Back:  Normal Inspection, No CVA Tenderness, No Vertebral Tenderness


Extremities:  Normal Range of Motion, Non-Tender, Normal Inspection, No Pedal 

Edema, No Calf Tenderness, Normal Capillary Refill, Pelvis Stable


Neurologic/Psychiatric:  CNs II-XII NML as Tested, No Motor/Sensory Deficits, 

Alert, Normal Mood/Affect, Oriented x 3


Skin:  Normal Color, Warm/Dry





Results/Orders


Results/Orders





Orders - KELLY CHÁVEZ MD


Cbc With Auto Diff (19 23:12)


Comprehensive Metabolic Panel (19 23:12)


Lipase (19 23:12)


Morphine Sulfate (Morphine Sulfate) (19 23:18)


Morphine Sulfate (Morphine Sulfate) (19 23:34)


0.9 % Sodium Chloride (Ns 1000ml) (19 23:51)


Ondansetron Hcl (Zofran) (19 23:51)


0.9 % Sodium Chloride (Ns 1000ml) (19 00:07)


Ondansetron Hcl (Zofran) (19 00:07)


0.9 % Sodium Chloride (Ns 1000ml) (19 01:04)





Vital Signs








  Date Time  Temp Pulse Resp B/P (MAP) Pulse Ox O2 Delivery O2 Flow Rate FiO2


 


19 23:05 97.9 79 18     


 


19 23:05 97.9 79 18 105/54 (71) 100 Room Air  


 


19 23:05 97.9 79 18  100 Room Air  


 


19 22:43  94      


 


19 03:02  162      








Administered Medications








 Medications


  (Trade)  Dose


 Ordered  Sig/Ralph


 Route


 PRN Reason  Start Time


 Stop Time Status Last Admin


Dose Admin


 


 Morphine Sulfate


  (Morphine


 Sulfate)  4 mg  STAT  STAT


 IM


   19 23:18


 19 23:20 DC 19 23:40


4 MG


 


 Ondansetron HCl


  (Zofran)  4 mg  STAT  STAT


 IV


   19 23:51


 19 23:53 DC 19 00:17


4 MG


 


 Sodium Chloride  1,000 ml @ 


 1,200 mls/hr  Q50M STAT


 IV


   19 23:51


 19 00:40 DC 19 00:17


1,200 MLS/HR








                                Laboratory Tests








Test


 19


23:20


 


White Blood Count


 3.9 10^3/uL


(4.5-11.0)  L


 


Red Blood Count


 2.57 10^6/uL


(4.00-5.20)  L


 


Hemoglobin


 9.1 g/dL


(12.0-15.0)  L


 


Hematocrit


 26.4 %


(36.0-46.0)  L


 


Mean Corpuscular Volume


 102.7 fL


()  H


 


Mean Corpuscular Hemoglobin


 35.4 pg


(26-34)  H


 


Mean Corpuscular Hemoglobin


Concent 34.5 g/dL


(33-37)


 


Red Cell Distribution Width


 16.4 %


(11.5-14.5)  H


 


Platelet Count


 64 10^3/uL


(150-400)  L


 


Mean Platelet Volume


 12.0 fL


(7.8-11.0)  H


 


Neutrophils (%) (Auto)


 50.7 %


(41.0-85.0)


 


Lymphocytes (%) (Auto)


 34.5 %


(24.0-44.0)


 


Monocytes (%) (Auto)


 11.6 %


(5.0-12.0)


 


Neutrophils # (Auto)


 2.0 10^3/uL


(1.8-7.7)


 


Lymphocytes # (Auto)


 1.3 10^3/uL


(1.0-4.8)


 


Monocytes # (Auto)


 0.5 10^3/uL


(0.3-0.8)


 


Absolute Immature Granulocyte


(auto 0.01 10^3 u/L


(0-2)


 


Immature Granulocytes %


 0.30 %


(0.00-0.50)


 


Eosinophils %


 2.6 %


(0.0-5.0)


 


Basophils %


 0.3 %


(0.0-0.2)  H


 


Basophils #


 0.0 10^3/uL


(0.0-0.1)


 


Eosinophil Count


 0.1 10^3/uL


(0.0-0.2)


 


Sodium Level


 139 mmol/L


(132-145)


 


Potassium Level


 4.3 mmol/L


(3.6-5.2)


 


Chloride Level


 108.0 mmol/L


()


 


Carbon Dioxide Level


 22.7 mmol/L


(20.0-32)


 


Anion Gap 12.6  


 


Blood Urea Nitrogen


 20 mg/dL


(7-18)  H


 


Creatinine


 2.73 mg/dL


(0.59-1.40)  *H


 


Estimated GFR (


American) 22.2 (>/=60)  





 


BUN/Creatinine Ratio 7.0  


 


Glucose Level


 105 mg/dL


()


 


Calcium Level


 7.6 mg/dL


(8.4-10.5)  L


 


Total Bilirubin


 2.1 mg/dL


(0.2-1.0)  H


 


Aspartate Amino Transferase


(AST) 150 U/L (0-35)


H


 


Alanine Aminotransferase (ALT)


 92 U/L (12-78)


H


 


Alkaline Phosphatase


 175 U/L


()  H


 


Total Protein


 5.5 g/dL


(6.4-8.2)  L


 


Albumin


 2.0 g/dL


(3.4-5.0)  L


 


Globulin 3.5  


 


Lipase


 945 U/L


(114-286)  H











Progress


Progress


CT abdomen/pelvis done on 19: Cirrhotic liver.  Mild ascites.


2. Cholelithiasis without imaging evidence for cholecystitis.  


3. Diffuse gastric and small bowel wall thickening is suspected.  This could be 


artifactual secondary to lack of distention with oral contrast.  However, most 


likely etiology is metabolic abnormality for example hypoalbuminemia.





Course


Sepsis Screening Results: Posi:  POSITIVE SEPSIS RISK


Duration or Total Time Spent w:  15


Vitals & review Data





Vital Sign - Last 24 Hours








 19





 03:02 22:43 23:05 23:05


 


Temp   97.9 97.9


 


Pulse 162 94 79 79


 


Resp   18 18


 


B/P (MAP)    105/54 (71)


 


Pulse Ox   100 100


 


O2 Delivery   Room Air Room Air


 


    





 19   





 23:05   


 


Temp 97.9   


 


Pulse 79   


 


Resp 18   








Laboratory Tests








Test


 19


23:20


 


White Blood Count 3.9 10^3/uL 


 


Red Blood Count 2.57 10^6/uL 


 


Hemoglobin 9.1 g/dL 


 


Hematocrit 26.4 % 


 


Mean Corpuscular Volume 102.7 fL 


 


Mean Corpuscular Hemoglobin 35.4 pg 


 


Mean Corpuscular Hemoglobin


Concent 34.5 g/dL 





 


Red Cell Distribution Width 16.4 % 


 


Platelet Count 64 10^3/uL 


 


Mean Platelet Volume 12.0 fL 


 


Neutrophils (%) (Auto) 50.7 % 


 


Lymphocytes (%) (Auto) 34.5 % 


 


Monocytes (%) (Auto) 11.6 % 


 


Neutrophils # (Auto) 2.0 10^3/uL 


 


Lymphocytes # (Auto) 1.3 10^3/uL 


 


Monocytes # (Auto) 0.5 10^3/uL 


 


Absolute Immature Granulocyte


(auto 0.01 10^3 u/L 





 


Immature Granulocytes % 0.30 % 


 


Eosinophils % 2.6 % 


 


Basophils % 0.3 % 


 


Basophils # 0.0 10^3/uL 


 


Eosinophil Count 0.1 10^3/uL 


 


Sodium Level 139 mmol/L 


 


Potassium Level 4.3 mmol/L 


 


Chloride Level 108.0 mmol/L 


 


Carbon Dioxide Level 22.7 mmol/L 


 


Anion Gap 12.6 


 


Blood Urea Nitrogen 20 mg/dL 


 


Creatinine 2.73 mg/dL 


 


Estimated GFR (


American) 22.2 





 


BUN/Creatinine Ratio 7.0 


 


Glucose Level 105 mg/dL 


 


Calcium Level 7.6 mg/dL 


 


Total Bilirubin 2.1 mg/dL 


 


Aspartate Amino Transf


(AST/SGOT) 150 U/L 





 


Alanine Aminotransferase


(ALT/SGPT) 92 U/L 





 


Alkaline Phosphatase 175 U/L 


 


Total Protein 5.5 g/dL 


 


Albumin 2.0 g/dL 


 


Globulin 3.5 


 


Lipase 945 U/L 








                               Current Medications








 Medications


  (Trade)  Dose


 Ordered  Sig/Ralph


 PRN Reason  Start Time


 Stop Time Status Last Admin


 


 Sodium Chloride  1,000 ml @ 


 1,200 mls/hr  Q50M STAT


   19 01:04


 19 01:53   











Sepsis Infection Criteria Pres:  None


O2 Sat by Pulse Oximetry:  100





Departure


Time of Disposition:  01:09


Disposition:  02 XFER [facility] HOSP


Impression:  


   Primary Impression:  


   Acute kidney failure, unspecified


   Additional Impressions:  


   Gallstone pancreatitis


   Dehydration


   Liver cirrhosis


Condition:  Stable


Referrals:  


PCP,UNKNOWN (PCP)


PRIMARY CARE PROVIDER


Comments


Transfer to [facility] ED for Dr. Garcia.


Duration or Time Spent with Pa:  45 mins





Problem Qualifiers








   Primary Impression:  


   Acute kidney failure, unspecified


   Acute renal failure type:  unspecified  Qualified Codes:  N17.9 - Acute 

   kidney failure, unspecified


   Additional Impressions:  


   Liver cirrhosis


   Hepatic cirrhosis type:  unspecified hepatic cirrhosis  Ascites presence:  

   unspecified  Qualified Codes:  K74.60 - Unspecified cirrhosis of liver








KELLY CHÁVEZ MD                Aug 24, 2019 23:20

## 2019-08-25 VITALS — DIASTOLIC BLOOD PRESSURE: 53 MMHG | SYSTOLIC BLOOD PRESSURE: 91 MMHG

## 2019-08-25 VITALS — DIASTOLIC BLOOD PRESSURE: 57 MMHG | SYSTOLIC BLOOD PRESSURE: 86 MMHG

## 2019-08-25 VITALS — DIASTOLIC BLOOD PRESSURE: 61 MMHG | SYSTOLIC BLOOD PRESSURE: 91 MMHG

## 2019-08-25 VITALS — SYSTOLIC BLOOD PRESSURE: 95 MMHG | DIASTOLIC BLOOD PRESSURE: 56 MMHG

## 2019-08-25 VITALS — DIASTOLIC BLOOD PRESSURE: 58 MMHG | SYSTOLIC BLOOD PRESSURE: 89 MMHG

## 2019-08-25 VITALS — DIASTOLIC BLOOD PRESSURE: 53 MMHG | SYSTOLIC BLOOD PRESSURE: 85 MMHG

## 2019-08-25 VITALS — SYSTOLIC BLOOD PRESSURE: 89 MMHG | DIASTOLIC BLOOD PRESSURE: 58 MMHG

## 2019-08-25 NOTE — NUR
[facility]



[facility] states that they will call us back when they get to the hospital 
to let us know if they will be able to take transfer.

## 2019-08-25 NOTE — NUR
UPDATE

PATIENTS BLOOD PRESSURE 80/44 NOTIFIED DR. CHÁVEZ AND NO NEW ORDERS.  IV NS BOLUS 
INFUSING AT THIS TIME.

## 2019-08-25 NOTE — NUR
DEPART

[facility] ARRIVED TO TRANSPORT PATIENT TO [facility] ED.  REPORT GIVEN RELINQUISHED 
CARE.  PATIENT AWAKE AND ALERT AND TRANSFERRED TO [facility].  IV OF NS INFUSING AT 
100ML/HR WITHOUT COMPLICATION.

## 2019-09-10 ENCOUNTER — HOSPITAL ENCOUNTER (EMERGENCY)
Dept: HOSPITAL 65 - ER | Age: 49
LOS: 1 days | Discharge: ANOTHER HOSPITAL | End: 2019-09-11
Payer: COMMERCIAL

## 2019-09-10 VITALS — SYSTOLIC BLOOD PRESSURE: 100 MMHG | DIASTOLIC BLOOD PRESSURE: 53 MMHG

## 2019-09-10 VITALS — BODY MASS INDEX: 16.79 KG/M2 | HEIGHT: 67 IN | WEIGHT: 107 LBS

## 2019-09-10 VITALS — DIASTOLIC BLOOD PRESSURE: 60 MMHG | SYSTOLIC BLOOD PRESSURE: 110 MMHG

## 2019-09-10 VITALS — DIASTOLIC BLOOD PRESSURE: 68 MMHG | SYSTOLIC BLOOD PRESSURE: 122 MMHG

## 2019-09-10 DIAGNOSIS — R11.2: ICD-10-CM

## 2019-09-10 DIAGNOSIS — N17.9: Primary | ICD-10-CM

## 2019-09-10 DIAGNOSIS — Z79.899: ICD-10-CM

## 2019-09-10 DIAGNOSIS — K76.7: ICD-10-CM

## 2019-09-10 DIAGNOSIS — Z79.1: ICD-10-CM

## 2019-09-10 DIAGNOSIS — K72.90: ICD-10-CM

## 2019-09-10 DIAGNOSIS — Z90.710: ICD-10-CM

## 2019-09-10 LAB
ALP INTEST CFR SERPL: 145 U/L (ref 50–136)
ALT SERPL-CCNC: 64 U/L (ref 12–78)
AST SERPL-CCNC: 124 U/L (ref 0–35)
CALCIUM SERPL-MCNC: 8.3 MG/DL (ref 8.4–10.5)
CO2 BLDA-SCNC: 20.4 MMOL/L (ref 20–32)
ERYTHROCYTE [DISTWIDTH] IN BLOOD BY AUTOMATED COUNT: 14.9 % (ref 11.5–14.5)
GLUCOSE PRE 100 G GLC PO SERPL-MCNC: 106 MG/DL (ref 70–110)
HGB BLD-MCNC: 8.3 G/DL (ref 12–15)
MCH RBC QN AUTO: 34.3 PG (ref 26–34)
MCHC RBC AUTO-ENTMCNC: 34.4 G/DL (ref 33–37)
MCV RBC AUTO: 99.6 FL (ref 78–100)
PLATELET # BLD AUTO: 68 10^3/UL (ref 150–400)
PMV BLD AUTO: 11.5 FL (ref 7.8–11)
WBC # BLD AUTO: 5 10^3/UL (ref 4.5–11)

## 2019-09-10 PROCEDURE — 82140 ASSAY OF AMMONIA: CPT

## 2019-09-10 PROCEDURE — 96361 HYDRATE IV INFUSION ADD-ON: CPT

## 2019-09-10 PROCEDURE — 83690 ASSAY OF LIPASE: CPT

## 2019-09-10 PROCEDURE — 96374 THER/PROPH/DIAG INJ IV PUSH: CPT

## 2019-09-10 PROCEDURE — 80048 BASIC METABOLIC PNL TOTAL CA: CPT

## 2019-09-10 PROCEDURE — 36415 COLL VENOUS BLD VENIPUNCTURE: CPT

## 2019-09-10 PROCEDURE — 80076 HEPATIC FUNCTION PANEL: CPT

## 2019-09-10 PROCEDURE — 99285 EMERGENCY DEPT VISIT HI MDM: CPT

## 2019-09-10 PROCEDURE — 85027 COMPLETE CBC AUTOMATED: CPT

## 2019-09-10 NOTE — ER.PDOC
General


Chief Complaint:  Requesting Medical Care


Stated Complaint:  VOMITING


Time seen by MD:  22:58


Source:  patient


Exam Limitations:  no limitations





History of Present Illness


Initial Comments


Nausea/vomiting today. Patient has gallbladder disease and liver cirrhosis and 

no one will do Surgery for her. Patient acting confused.


Severity/Quality:  moderate


Associated Symptoms (vomiting):  freq vomitng


Prior symptoms/Treatment:  Similar symptoms previous, Recenly Seen, Treated by 

Doctor


Allergies:  


Coded Allergies:  


     No Known Drug Allergies (Verified  Allergy, Unknown, 19)


Home Meds


Reported Medications


Torsemide (DEMADEX) 20 Mg Tablet, 10 MG PO DAILY24, TABLET


   19


Hydroxyzine Hcl (HYDROXYZINE HCL) 25 Mg Tablet, 1 TAB PO HS, #30 TAB


   19


Naproxen (NAPROXEN) 500 Mg Tablet, 1 TAB PO BID for PAIN, #60 TAB 1 Refill


   19


Potassium Chloride (POTASSIUM CHLORIDE) 10 Meq Capsule.er, 1 CAP PO DAILY, #90 

CAP 1 Refill


   19


Lactulose (LACTULOSE) 10 Gm/15 Ml Solution, 30 MILLILITER PO BID, #5400 

MILLILITER 3 Refills


   19


Omeprazole (OMEPRAZOLE) 20 Mg Capsule.dr, 1 CAP PO BID, #30 CAP 5 Refills


   19


Furosemide (FUROSEMIDE) 20 Mg Tablet, 1 TAB PO DAILY, #90 TAB 1 Refill


   19


Spironolactone 25MG (ALDACTONE 25MG) 25 Mg Tablet, 1 TAB PO BID, #90 TAB 1 

Refill


   19


Promethazine Hcl (PROMETHAZINE HCL) 25 Mg Tablet, 25 MG PO PRN PRN for 

NAUSEA/VOMITING, TABLET


   19


Pantoprazole Sodium (PANTOPRAZOLE SODIUM) 40 Mg Tablet.dr, 1 TAB PO DAILY, #30 

TAB 3 Refills


   19


Vital Signs





First Vital Signs








  Date Time  Temp Pulse Resp B/P (MAP) Pulse Ox O2 Delivery O2 Flow Rate FiO2


 


19 03:02  162      








Last Vital Signs








  Date Time  Temp Pulse Resp B/P (MAP) Pulse Ox O2 Delivery O2 Flow Rate FiO2


 


19 02:12  81      











Past Medical History


Medical History:  other (Liver cirrhosis)


Surgical History:  , hysterectomy





Social History


Drug Use:  none





Constitutional:  no symptoms reported


Respiratory:  no symptoms reported


Cardiovascular:  no symptoms reported


Gastrointestinal:  see HPI


Genitourinary:  no symptoms reported


All Other Systems:  Reviewed and Negative





Physical Exam


General Appearance:  No Apparent Distress, WD/WN, Cachetic


HEENT:  Scleral Icterus (R), Scleral Icterus (L), Pale Conjunctivae (R), Pale 

Conjunctivae (L)


Neck:  Non-Tender, Full Range of Motion, Supple, Normal Inspection


Respiratory:  chest non-tender, lungs clear, normal breath sounds, no 

respiratory distress, no accessory muscle use


Cardiovascular:  Normal Peripheral Pulses, Regular Rate, Rhythm, No Edema, No 

Gallop, No JVD, No Murmur


Gastrointestinal:  Normal Bowel Sounds, No Pulsatile Mass, Non Tender, Distended


Back:  Normal Inspection, No CVA Tenderness, No Vertebral Tenderness


Extremities:  Normal Range of Motion, Non-Tender, Normal Inspection, No Pedal 

Edema, No Calf Tenderness, Normal Capillary Refill, Pelvis Stable


Neurologic/Psychiatric:  Other (altered)





Results/Orders


Results/Orders





Orders - KELLY CHÁVEZ MD


0.9 % Sodium Chloride (Ns 1000ml) (9/10/19 22:32)


Promethazine Hcl (Phenergan) (9/10/19 22:32)


Cbc W/O Diff (9/10/19 22:32)


Basic Metabolic Panel (9/10/19 22:32)


0.9 % Sodium Chloride (Ns 1000ml) (9/10/19 22:34)


Promethazine Hcl (Phenergan) (9/10/19 22:34)


Liver Function Panel (9/10/19 23:32)


Lipase (9/10/19 23:32)


Ammonia (9/10/19 23:38)


Lactulose (Cephulac) (19 00:03)





Vital Signs








  Date Time  Temp Pulse Resp B/P (MAP) Pulse Ox O2 Delivery O2 Flow Rate FiO2


 


19 02:12  81      


 


19 03:02  162      








Administered Medications








 Medications


  (Trade)  Dose


 Ordered  Sig/Ralph


 Route


 PRN Reason  Start Time


 Stop Time Status Last Admin


Dose Admin


 


 Promethazine HCl


  (Phenergan)  25 mg  STAT  STAT


 IV


   9/10/19 22:32


 9/10/19 22:34 DC 9/10/19 22:43


25 MG


 


 Sodium Chloride  1,000 ml @ 


 1,200 mls/hr  Q50M STAT


 IV


   9/10/19 22:32


 9/10/19 23:21 DC 9/10/19 22:43


1,200 MLS/HR








                                Laboratory Tests








Test


 9/10/19


22:42 9/10/19


23:43


 


White Blood Count


 5.0 10^3/uL


(4.5-11.0) 





 


Red Blood Count


 2.42 10^6/uL


(4.00-5.20)  L 





 


Hemoglobin


 8.3 g/dL


(12.0-15.0)  L 





 


Hematocrit


 24.1 %


(36.0-46.0)  L 





 


Mean Corpuscular Volume


 99.6 fL


() 





 


Mean Corpuscular Hemoglobin


 34.3 pg


(26-34)  H 





 


Mean Corpuscular Hemoglobin


Concent 34.4 g/dL


(33-37) 





 


Red Cell Distribution Width


 14.9 %


(11.5-14.5)  H 





 


Platelet Count


 68 10^3/uL


(150-400)  L 





 


Mean Platelet Volume


 11.5 fL


(7.8-11.0)  H 





 


Sodium Level


 142 mmol/L


(132-145) 





 


Potassium Level


 4.8 mmol/L


(3.6-5.2) 





 


Chloride Level


 110.0 mmol/L


()  H 





 


Carbon Dioxide Level


 20.4 mmol/L


(20.0-32) 





 


Glucose Level


 106 mg/dL


() 





 


Blood Urea Nitrogen


 28 mg/dL


(7-18)  H 





 


Creatinine


 4.29 mg/dL


(0.59-1.40)  *H 





 


Calcium Level


 8.3 mg/dL


(8.4-10.5)  L 





 


Anion Gap 16.4   


 


Estimated GFR (


American) 13.2 (>/=60)  


 





 


BUN/Creatinine Ratio 6.0   


 


Total Bilirubin


 2.6 mg/dL


(0.2-1.0)  H 





 


Direct Bilirubin


 1.5 mg/dL


(0.0-0.3)  H 





 


Indirect Bilirubin 1.1  mg/dL   


 


Aspartate Amino Transferase


(AST) 124 U/L (0-35)


H 





 


Alanine Aminotransferase (ALT)


 64 U/L (12-78)


 





 


Alkaline Phosphatase


 145 U/L


()  H 





 


Total Protein


 5.7 g/dL


(6.4-8.2)  L 





 


Albumin


 1.9 g/dL


(3.4-5.0)  L 





 


Lipase


 560 U/L


(114-286)  H 





 


Ammonia


 


 147 umol/L


(11-35)  H











Departure


Time of Disposition:  00:12


Disposition:  02 XFER S[facility]-TRM HOSP


Impression:  


   Primary Impression:  


   MARTÍNEZ (acute kidney injury)


   Additional Impressions:  


   Hepatic encephalopathy


   Hepatorenal syndrome


Condition:  Stable


Referrals:  


DESHAUN HUMMEL (PCP)


PRIMARY CARE PROVIDER


Comments


Transfer to [facility] ED for Dr. Little


Duration or Time Spent with Pa:  60 mins





Problem Qualifiers











KELLY CHÁVEZ MD                Sep 10, 2019 23:00

## 2019-09-11 VITALS — DIASTOLIC BLOOD PRESSURE: 45 MMHG | SYSTOLIC BLOOD PRESSURE: 112 MMHG
